# Patient Record
Sex: MALE | Race: BLACK OR AFRICAN AMERICAN | NOT HISPANIC OR LATINO | Employment: STUDENT | URBAN - METROPOLITAN AREA
[De-identification: names, ages, dates, MRNs, and addresses within clinical notes are randomized per-mention and may not be internally consistent; named-entity substitution may affect disease eponyms.]

---
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
## 2025-07-27 ENCOUNTER — HOSPITAL ENCOUNTER (EMERGENCY)
Facility: HOSPITAL | Age: 20
Discharge: HOME | End: 2025-07-27
Attending: EMERGENCY MEDICINE
Payer: MEDICAID

## 2025-07-27 VITALS
WEIGHT: 159 LBS | SYSTOLIC BLOOD PRESSURE: 118 MMHG | OXYGEN SATURATION: 98 % | RESPIRATION RATE: 18 BRPM | HEIGHT: 75 IN | BODY MASS INDEX: 19.77 KG/M2 | TEMPERATURE: 99 F | HEART RATE: 80 BPM | DIASTOLIC BLOOD PRESSURE: 80 MMHG

## 2025-07-27 DIAGNOSIS — H00.015 HORDEOLUM EXTERNUM OF LEFT LOWER EYELID: ICD-10-CM
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
## 2025-07-27 PROCEDURE — 99283 EMERGENCY DEPT VISIT LOW MDM: CPT | Mod: ER

## 2025-07-27 PROCEDURE — 25000003 PHARM REV CODE 250: Mod: ER

## 2025-07-27 RX ORDER — SULFAMETHOXAZOLE AND TRIMETHOPRIM 800; 160 MG/1; MG/1
1 TABLET ORAL 2 TIMES DAILY
Qty: 14 TABLET | Refills: 0 | Status: SHIPPED | OUTPATIENT
Start: 2025-07-27 | End: 2025-07-28 | Stop reason: ALTCHOICE

## 2025-07-27 RX ORDER — ERYTHROMYCIN 5 MG/G
OINTMENT OPHTHALMIC
Qty: 3.5 G | Refills: 0 | Status: SHIPPED | OUTPATIENT
Start: 2025-07-27

## 2025-07-27 RX ORDER — SULFAMETHOXAZOLE AND TRIMETHOPRIM 800; 160 MG/1; MG/1
1 TABLET ORAL
Status: COMPLETED | OUTPATIENT
Start: 2025-07-27 | End: 2025-07-27

## 2025-07-27 RX ADMIN — SULFAMETHOXAZOLE AND TRIMETHOPRIM 1 TABLET: 800; 160 TABLET ORAL at 09:07

## 2025-07-28 ENCOUNTER — TELEPHONE (OUTPATIENT)
Facility: OTHER | Age: 20
End: 2025-07-28
Payer: MEDICAID

## 2025-07-28 ENCOUNTER — HOSPITAL ENCOUNTER (EMERGENCY)
Facility: HOSPITAL | Age: 20
Discharge: HOME | End: 2025-07-28
Attending: EMERGENCY MEDICINE
Payer: MEDICAID

## 2025-07-28 VITALS
BODY MASS INDEX: 19.77 KG/M2 | TEMPERATURE: 98 F | OXYGEN SATURATION: 100 % | HEART RATE: 72 BPM | RESPIRATION RATE: 18 BRPM | DIASTOLIC BLOOD PRESSURE: 73 MMHG | WEIGHT: 159 LBS | SYSTOLIC BLOOD PRESSURE: 136 MMHG | HEIGHT: 75 IN

## 2025-07-28 DIAGNOSIS — S90.861D: Primary | ICD-10-CM
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
## 2025-07-28 PROCEDURE — 90471 IMMUNIZATION ADMIN: CPT | Performed by: EMERGENCY MEDICINE

## 2025-07-28 PROCEDURE — 25000003 PHARM REV CODE 250: Performed by: EMERGENCY MEDICINE

## 2025-07-28 PROCEDURE — 99283 EMERGENCY DEPT VISIT LOW MDM: CPT | Mod: 25

## 2025-07-28 PROCEDURE — 90715 TDAP VACCINE 7 YRS/> IM: CPT | Performed by: EMERGENCY MEDICINE

## 2025-07-28 PROCEDURE — 63600175 PHARM REV CODE 636 W HCPCS: Performed by: EMERGENCY MEDICINE

## 2025-07-28 RX ORDER — CETIRIZINE HYDROCHLORIDE 10 MG/1
10 TABLET ORAL DAILY
Qty: 30 TABLET | Refills: 0 | Status: SHIPPED | OUTPATIENT
Start: 2025-07-28 | End: 2025-08-27

## 2025-07-28 RX ORDER — CETIRIZINE HYDROCHLORIDE 5 MG/1
10 TABLET ORAL
Status: COMPLETED | OUTPATIENT
Start: 2025-07-28 | End: 2025-07-28

## 2025-07-28 RX ORDER — CEPHALEXIN 500 MG/1
500 CAPSULE ORAL 4 TIMES DAILY
Qty: 20 CAPSULE | Refills: 0 | Status: SHIPPED | OUTPATIENT
Start: 2025-07-28 | End: 2025-08-02

## 2025-07-28 RX ADMIN — CLOSTRIDIUM TETANI TOXOID ANTIGEN (FORMALDEHYDE INACTIVATED), CORYNEBACTERIUM DIPHTHERIAE TOXOID ANTIGEN (FORMALDEHYDE INACTIVATED), BORDETELLA PERTUSSIS TOXOID ANTIGEN (GLUTARALDEHYDE INACTIVATED), BORDETELLA PERTUSSIS FILAMENTOUS HEMAGGLUTININ ANTIGEN (FORMALDEHYDE INACTIVATED), BORDETELLA PERTUSSIS PERTACTIN ANTIGEN, AND BORDETELLA PERTUSSIS FIMBRIAE 2/3 ANTIGEN 0.5 ML: 5; 2; 2.5; 5; 3; 5 INJECTION, SUSPENSION INTRAMUSCULAR at 09:07

## 2025-07-28 RX ADMIN — CETIRIZINE HYDROCHLORIDE 10 MG: 5 TABLET, FILM COATED ORAL at 09:07

## 2025-07-28 NOTE — PROGRESS NOTES
Pt states he has spoken to his Dr and has received his medications. Number provided via email to [email or web address] for the 24hr Nurse Line and PCP helpline. Pt and mother verbalized understanding. Pt encouraged to reach out with any concerns at they arise.

## 2025-07-28 NOTE — ED TRIAGE NOTES
Mr. Dennis Rocha is a 21 y/o M who presents to the ED by POV with complaints of R foot swelling. Patient states he was bitten by a bug 2 days ago while he was outside, came to the ED last night where they circled the swelling/redness area and told him to come in if the swelling/redness increased to outside the circled area. The swelling and redness has since increased to outside the marked area. Endorses pain a 5 on a 0-10 pain scale.

## 2025-07-28 NOTE — DISCHARGE INSTRUCTIONS
I think you likely have a local reaction to a sting or bite from an insect. I recommend ice packs and elevation. Take Zyrtec daily to help with itching and inflammation. You can use over the counter hydrocortisone cream to help with itching as well.  I have prescribed an antibiotic called Keflex you can start if you develop more redness, pain over the next few days.     Seek immediate medical attention if you have pus from your wound, fever, increasing redness, severe pain, or for any other concern.

## 2025-07-28 NOTE — ED PROVIDER NOTES
Encounter Date: 7/27/2025       History     Chief Complaint   Patient presents with    Insect Bite     R lateral foot insect bite, warm to touch     Patient is a 20-year-old male with no significant past medical history who presents to emergency room for pain and swelling to right foot after insect bite that occurred yesterday.  Patient states that he was walking outside when he felt a sting to his foot.  Afterwards, he started to significant swelling to the area.  This morning he woke up and it was red and warm to touch.  This prompted visit to the emergency room.  No fever, body aches, or chills.  No decreased range of motion.  Mother states that patient had swelling to insect bites when he was younger.  No medications taken prior to arrival.    Of note, patient also concerned for swelling to left lower eyelid.  Believes it is a stye.  No drainage.  No eye pain.    The history is provided by the patient. No  was used.     Review of patient's allergies indicates:  No Known Allergies  History reviewed. No pertinent past medical history.  Past Surgical History:   Procedure Laterality Date    KNEE ARTHROSCOPY W/ ACL RECONSTRUCTION Right 9/26/2022    Procedure: RECONSTRUCTION, KNEE, ACL, ARTHROSCOPIC, BTB;  Surgeon: Josh Vasquez MD;  Location: [facility];  Service: Orthopedics;  Laterality: Right;  regional with catheter     Family History   Problem Relation Name Age of Onset    Lupus Mother      Fibromyalgia Mother      No Known Problems Father       Social History[1]  Review of Systems   Constitutional:  Negative for chills, diaphoresis, fatigue and fever.   HENT:  Positive for facial swelling (left lower eyelid).    Eyes:  Negative for pain, discharge and itching.   Musculoskeletal:  Positive for arthralgias (right foot). Negative for joint swelling and myalgias.   Skin:  Positive for wound (insect bite). Negative for color change and rash.   Neurological:  Negative for weakness and numbness.        Physical Exam     Initial Vitals [07/27/25 2052]   BP Pulse Resp Temp SpO2   107/72 101 15 98 °F (36.7 °C) 98 %      MAP       --         Physical Exam    Nursing note and vitals reviewed.  Constitutional: He appears well-developed and well-nourished. He is not diaphoretic. No distress.   HENT:   Head: Normocephalic and atraumatic.   Right Ear: External ear normal.   Left Ear: External ear normal.   Eyes: Conjunctivae and EOM are normal. Right eye exhibits no hordeolum. Left eye exhibits hordeolum.       Neck: Neck supple.   Normal range of motion.  Pulmonary/Chest: No respiratory distress.   Musculoskeletal:         General: No edema. Normal range of motion.      Cervical back: Normal range of motion and neck supple.      Right foot: Normal range of motion. Swelling and tenderness present.        Feet:       Comments: Patient with full range of motion of ankle and foot.  Dorsalis pedis pulses 2+.     Neurological: He is alert and oriented to person, place, and time. He has normal strength.   Skin: Skin is warm. Capillary refill takes less than 2 seconds.   Psychiatric: He has a normal mood and affect. His behavior is normal. Thought content normal.         ED Course   Procedures  Labs Reviewed - No data to display       Imaging Results    None          Medications   sulfamethoxazole-trimethoprim 800-160mg per tablet 1 tablet (1 tablet Oral Given 7/27/25 2135)     Medical Decision Making  Patient presents to emergency room for swelling and redness surrounding insect bite.  Vital signs stable.  Physical exam as stated above.    Differential diagnosis includes but is not limited to local reaction to insect bite, cellulitis, abscess, among others.  It is more likely that patient has having a local reaction to an insect bite to the swelling.  However, he does have erythema and mild warmth.  Can not rule out early infection at this time.  Will place patient on Bactrim.  Advised patient and mother on  over-the-counter medications such as hydrocortisone cream and Zyrtec/Benadryl.  First dose of Bactrim given while in the emergency room.  Discussed wound care.  I have also prescribe patient erythromycin ointment for stye to left eye.    No acute emergent medical condition has been identified beyond that addressed during our encounter. The patient appears to be low risk for an emergent medical condition is appropriate for discharge with outpatient f/u as detailed in discharge instructions for reevaluation and possible continued outpatient workup and management. I have discussed the workup with the patient, who has verbalized understanding of the plan and need for outpatient follow-up. Also, all prescribed and over the counter medications I have advised patient to take have been discussed at length in addition to risks and benefits of taking these medications. This evaluation does not preclude the development of an emergent condition so in addition, I have advised the patient that they can return to the ED at any time with worsening or change of their symptoms, or with any other medical complaint.     Problems Addressed:  Bug bite with infection, initial encounter: acute illness or injury  Hordeolum externum of left lower eyelid: acute illness or injury    Amount and/or Complexity of Data Reviewed  External Data Reviewed: notes.     Details: Patient last seen by sports medicine in 09/2023.  Radiology:      Details: Considered ordering x-ray.  However, no blunt trauma.  Low suspicion for fracture or dislocation.    Risk  Prescription drug management.  Risk Details: Comorbidities taken into consideration during the patient's evaluation and treatment include none.    Social determinants of health taken into consideration during development of our treatment plan include difficulty in obtaining follow-up, obtaining medications, health literacy, access to healthy options for preventative/conservative management, and/or  support systems due to, but not limited to, transportation limitations, socioeconomic status, and environmental factors.                                           Clinical Impression:  Final diagnoses:  [W57.XXXA] Bug bite with infection, initial encounter (Primary)  [H00.015] Hordeolum externum of left lower eyelid          ED Disposition Condition    Discharge Stable          ED Prescriptions       Medication Sig Dispense Start Date End Date Auth. Provider    sulfamethoxazole-trimethoprim 800-160mg (BACTRIM DS) 800-160 mg Tab (Expires today) Take 1 tablet by mouth 2 (two) times daily. for 7 days 14 tablet 7/27/2025 7/28/2025 Meagan Sandhu PA-C    erythromycin (ROMYCIN) ophthalmic ointment Place a 1/2 inch ribbon of ointment into the lower eyelid. 3.5 g 7/27/2025 -- Meagan Sandhu PA-C          Follow-up Information       Follow up With Specialties Details Why Contact [facility] - Emergency Dept Emergency Medicine   [address]  Emergency Department  [facility] [address] [phone]    Your doctor  Schedule an appointment as soon as possible for a visit  As soon as possible for follow up             This note was partially created using [employer] Voice Recognition software. Typographical and content errors may occur with this process. While efforts are made to detect and correct such errors, in some cases errors will persist. For this reason, wording in this document should be considered in the proper context and not strictly verbatim.          [1]   Social History  Tobacco Use    Smoking status: Never    Smokeless tobacco: Never   Substance Use Topics    Alcohol use: No    Drug use: No        Meagan Sandhu PA-C  07/28/25 8917

## 2025-07-28 NOTE — ED PROVIDER NOTES
Encounter Date: 7/28/2025       History     Chief Complaint   Patient presents with    Foot Swelling     Insect bite to R foot 2 days ago, foot continuing to swell since     Pt is a 19 yo M with no significant PMH who presents with pain and swelling to right foot for the past 36 hours since feeling a sting on that foot when he walked outside barefoot. He reports he felt the sting but did not see what caused it. He has had significant itching since. He was seen in the ED last night and was prescribed Bactrim which he has not started (was given one dose in the ED.) He is not using anti histamine or steroid cream. No fever. Able to range his foot. No numbness.  Was told to return if redness increased. Mother present at bedside reports patient used to have an exaggerated reaction to insect bites as a child.     The history is provided by the patient and a relative.     Review of patient's allergies indicates:  No Known Allergies  History reviewed. No pertinent past medical history.  Past Surgical History:   Procedure Laterality Date    KNEE ARTHROSCOPY W/ ACL RECONSTRUCTION Right 9/26/2022    Procedure: RECONSTRUCTION, KNEE, ACL, ARTHROSCOPIC, BTB;  Surgeon: Josh Vasquez MD;  Location: [facility];  Service: Orthopedics;  Laterality: Right;  regional with catheter     Family History   Problem Relation Name Age of Onset    Lupus Mother      Fibromyalgia Mother      No Known Problems Father       Social History[1]      Physical Exam     Initial Vitals [07/28/25 0916]   BP Pulse Resp Temp SpO2   136/73 72 18 97.9 °F (36.6 °C) 100 %      MAP       --         Physical Exam    Nursing note and vitals reviewed.  Constitutional: He appears well-developed and well-nourished. He is not diaphoretic. No distress.   HENT:   Head: Normocephalic and atraumatic.   Eyes: EOM are normal.   Neck: Neck supple.   Normal range of motion.  Cardiovascular:  Normal rate and regular rhythm.           Pulmonary/Chest: No respiratory distress.    Abdominal: He exhibits no distension.   Musculoskeletal:         General: Normal range of motion.      Cervical back: Normal range of motion and neck supple.     Neurological: He is alert and oriented to person, place, and time. GCS score is 15. GCS eye subscore is 4. GCS verbal subscore is 5. GCS motor subscore is 6.   Skin: Skin is warm and dry.   Right lateral foot with swelling, mild redness, mild calor   Small 1-2 central papule that is erythematous, no fluctuance, no induration  No pus   Psychiatric: He has a normal mood and affect. His behavior is normal. Judgment and thought content normal.         ED Course   Procedures  Labs Reviewed   HEPATITIS C ANTIBODY   HEP C VIRUS HOLD SPECIMEN   HIV 1 / 2 ANTIBODY          Imaging Results    None          Medications   cetirizine tablet 10 mg (has no administration in time range)   Tdap vaccine injection 0.5 mL (has no administration in time range)     Medical Decision Making  DDX: local reaction to insect bite, cellulitis, abscess, retained stinger    Likely local reaction to an insect bite and not infected at this time  Patient is not diabetic and not immunosuppressed  Tetanus updated  Started on zyrtec, elevation, ice, and hydrocortisone cream    Ordered Keflex should his skin start to appear more infected-pain, redness, fever  Will return if he has pus or rapid progression, fever    Risk  OTC drugs.  Prescription drug management.                                          Clinical Impression:  Final diagnoses:  [S90.861D, W57.XXXD] Insect bite of foot with local reaction, right, subsequent encounter (Primary)          ED Disposition Condition    Discharge Stable          ED Prescriptions       Medication Sig Dispense Start Date End Date Auth. Provider    cephALEXin (KEFLEX) 500 MG capsule Take 1 capsule (500 mg total) by mouth 4 (four) times daily. for 5 days 20 capsule 7/28/2025 8/2/2025 Erika Pascual MD    cetirizine (ZYRTEC) 10 MG tablet Take 1  tablet (10 mg total) by mouth once daily. 30 tablet 7/28/2025 8/27/2025 Erika Pascual MD          Follow-up Information       Follow up With Specialties Details Why Contact Info    [facility] - Emergency Dept Emergency Medicine  As needed [address]  [facility] [id number] [phone]                   [1]   Social History  Tobacco Use    Smoking status: Never    Smokeless tobacco: Never   Substance Use Topics    Alcohol use: No    Drug use: No        Erika Pascual MD  07/28/25 0971

## 2025-07-28 NOTE — DISCHARGE INSTRUCTIONS
Please keep your wound clean and dry. Wash gently with soap and water and apply antibiotic ointment (bacitracin, neosporin, etc.) over the wound after washing. Watch for signs of infection including: increased\spreading redness, swelling, pus-like discharge, or a fever greater than 100.4F. If you experience any of these, please contact your primary care doctor or return to the emergency room for a wound check. You may take over the counter Ibuprofen or Tylenol as needed for pain.     Thank you for allowing me and my emergency team to take care of you here today! I hope you feel better soon. Please do not hesitate to return with any additional concerns that may arise from this or any new problem you encounter.    Our goal in the emergency department is to always give you outstanding care and exceptional service. If you receive a survey by mail or e-mail in the next week regarding your experience in our ED, we would greatly appreciate you completing it. Your feedback provides us with a way to recognize our staff who give very good care and it helps us learn how to improve when your experience was below the excellence we aspire to be!    Brook [place], PA-C [facility] [place]   Emergency Room Physician Assistant

## (undated) DEVICE — SUT MCRYL PLUS 4-0 PS2 27IN

## (undated) DEVICE — SUT VICRYL PLUS 3-0 SH 18IN

## (undated) DEVICE — KIT TOTAL KNEE TKOFG OMC

## (undated) DEVICE — SOL IRR NACL .9% 3000ML

## (undated) DEVICE — PAD DERMAPROX THCK 11X15X1IN

## (undated) DEVICE — CONTAINER SPECIMEN OR STER 4OZ

## (undated) DEVICE — SUT FIBERLINK TAPE BLU WHT 1.3

## (undated) DEVICE — UNDERGLOVES BIOGEL PI SIZE 7.5

## (undated) DEVICE — MAT SURGICAL ECOSUCTIONER

## (undated) DEVICE — UNDERGLOVES BIOGEL PI SZ 7 LF

## (undated) DEVICE — TUBE SET INFLOW/OUTFLOW

## (undated) DEVICE — TUBING SUC UNIV W/CONN 12FT

## (undated) DEVICE — DRAPE TOP 53X102IN

## (undated) DEVICE — ADHESIVE DERMABOND ADVANCED

## (undated) DEVICE — SUT FIBERWIRE LOOP STRAIGHT

## (undated) DEVICE — DRAPE C-ARM MINI DISP

## (undated) DEVICE — NDL SPINAL 18GX3.5 SPINOCAN

## (undated) DEVICE — BLADE SHAVER 4.5 6/BX

## (undated) DEVICE — GLOVE BIOGEL SKINSENSE PI 7.0

## (undated) DEVICE — GLOVE PROTEXIS LIGHT BROWN 8.5

## (undated) DEVICE — SKIN MARKER DEVON 160

## (undated) DEVICE — TOURNIQUET SB QC SP 30X4IN

## (undated) DEVICE — COLLECTOR GRAFTNET TISS AUTOLG

## (undated) DEVICE — GOWN POLY REINF X-LONG 2XL

## (undated) DEVICE — PAD ELECTRODE STER 1.5X3

## (undated) DEVICE — PAD CAST SPECIALIST STRL 6

## (undated) DEVICE — SOL 9P NACL IRR PIC IL

## (undated) DEVICE — ANCHOR SUT FIBERSTITCH 2-0 CRV
Type: IMPLANTABLE DEVICE | Site: KNEE | Status: NON-FUNCTIONAL
Removed: 2022-09-26

## (undated) DEVICE — BLADE SHAVER LANZA 4.2X13CM

## (undated) DEVICE — SUT FIBERWIRE

## (undated) DEVICE — WRAP KNEE ACCU THERM GEL PACK

## (undated) DEVICE — KIT ACL DISP W/O SAW BLADE

## (undated) DEVICE — KNIFE ACL GRAFT 10MM

## (undated) DEVICE — PROBE ARTHSCP EDGE ENERGY 50

## (undated) DEVICE — SUT VICRYL PLUS 0 CT1 18IN

## (undated) DEVICE — BRACE KNEE T SCOPE PREMIER

## (undated) DEVICE — REAMER LOW PROFILE 10 MM

## (undated) DEVICE — Device

## (undated) DEVICE — BLADE SURG STAINLESS STEEL #15

## (undated) DEVICE — UNDERGLOVES BIOGEL PI SIZE 8.5

## (undated) DEVICE — DRAPE STERI U-SHAPED 47X51IN

## (undated) DEVICE — GLOVE BIOGEL SKINSENSE PI 8.0

## (undated) DEVICE — BLADE ACL FINE 9.5X25.5X.4MM

## (undated) DEVICE — DRAPE T EXTRM SURG 121X128X90

## (undated) DEVICE — COVER LIGHT HANDLE 80/CA

## (undated) DEVICE — DRESSING AQUACEL AG 3.5X10IN